# Patient Record
Sex: MALE | Race: WHITE | NOT HISPANIC OR LATINO | URBAN - METROPOLITAN AREA
[De-identification: names, ages, dates, MRNs, and addresses within clinical notes are randomized per-mention and may not be internally consistent; named-entity substitution may affect disease eponyms.]

---

## 2022-04-19 ENCOUNTER — EMERGENCY (EMERGENCY)
Facility: HOSPITAL | Age: 47
LOS: 1 days | Discharge: LEFT BEFORE DISCHARGE | End: 2022-04-19
Attending: EMERGENCY MEDICINE | Admitting: EMERGENCY MEDICINE
Payer: COMMERCIAL

## 2022-04-19 VITALS
RESPIRATION RATE: 18 BRPM | HEART RATE: 70 BPM | DIASTOLIC BLOOD PRESSURE: 81 MMHG | SYSTOLIC BLOOD PRESSURE: 158 MMHG | TEMPERATURE: 98 F | OXYGEN SATURATION: 99 % | HEIGHT: 73 IN | WEIGHT: 169.98 LBS

## 2022-04-19 DIAGNOSIS — R10.9 UNSPECIFIED ABDOMINAL PAIN: ICD-10-CM

## 2022-04-19 DIAGNOSIS — F32.A DEPRESSION, UNSPECIFIED: ICD-10-CM

## 2022-04-19 LAB
ALBUMIN SERPL ELPH-MCNC: 4.2 G/DL — SIGNIFICANT CHANGE UP (ref 3.3–5)
ALP SERPL-CCNC: 69 U/L — SIGNIFICANT CHANGE UP (ref 40–120)
ALT FLD-CCNC: 16 U/L — SIGNIFICANT CHANGE UP (ref 10–45)
ANION GAP SERPL CALC-SCNC: 12 MMOL/L — SIGNIFICANT CHANGE UP (ref 5–17)
AST SERPL-CCNC: 20 U/L — SIGNIFICANT CHANGE UP (ref 10–40)
BASOPHILS # BLD AUTO: 0.03 K/UL — SIGNIFICANT CHANGE UP (ref 0–0.2)
BASOPHILS NFR BLD AUTO: 0.4 % — SIGNIFICANT CHANGE UP (ref 0–2)
BILIRUB SERPL-MCNC: 0.5 MG/DL — SIGNIFICANT CHANGE UP (ref 0.2–1.2)
BUN SERPL-MCNC: 16 MG/DL — SIGNIFICANT CHANGE UP (ref 7–23)
CALCIUM SERPL-MCNC: 9.3 MG/DL — SIGNIFICANT CHANGE UP (ref 8.4–10.5)
CHLORIDE SERPL-SCNC: 102 MMOL/L — SIGNIFICANT CHANGE UP (ref 96–108)
CO2 SERPL-SCNC: 24 MMOL/L — SIGNIFICANT CHANGE UP (ref 22–31)
CREAT SERPL-MCNC: 0.82 MG/DL — SIGNIFICANT CHANGE UP (ref 0.5–1.3)
EGFR: 110 ML/MIN/1.73M2 — SIGNIFICANT CHANGE UP
EOSINOPHIL # BLD AUTO: 0.16 K/UL — SIGNIFICANT CHANGE UP (ref 0–0.5)
EOSINOPHIL NFR BLD AUTO: 2.2 % — SIGNIFICANT CHANGE UP (ref 0–6)
GLUCOSE SERPL-MCNC: 110 MG/DL — HIGH (ref 70–99)
HCT VFR BLD CALC: 45.6 % — SIGNIFICANT CHANGE UP (ref 39–50)
HGB BLD-MCNC: 15.7 G/DL — SIGNIFICANT CHANGE UP (ref 13–17)
IMM GRANULOCYTES NFR BLD AUTO: 0.3 % — SIGNIFICANT CHANGE UP (ref 0–1.5)
LIDOCAIN IGE QN: 41 U/L — SIGNIFICANT CHANGE UP (ref 7–60)
LYMPHOCYTES # BLD AUTO: 2.08 K/UL — SIGNIFICANT CHANGE UP (ref 1–3.3)
LYMPHOCYTES # BLD AUTO: 29.1 % — SIGNIFICANT CHANGE UP (ref 13–44)
MCHC RBC-ENTMCNC: 31.6 PG — SIGNIFICANT CHANGE UP (ref 27–34)
MCHC RBC-ENTMCNC: 34.4 GM/DL — SIGNIFICANT CHANGE UP (ref 32–36)
MCV RBC AUTO: 91.8 FL — SIGNIFICANT CHANGE UP (ref 80–100)
MONOCYTES # BLD AUTO: 0.61 K/UL — SIGNIFICANT CHANGE UP (ref 0–0.9)
MONOCYTES NFR BLD AUTO: 8.5 % — SIGNIFICANT CHANGE UP (ref 2–14)
NEUTROPHILS # BLD AUTO: 4.25 K/UL — SIGNIFICANT CHANGE UP (ref 1.8–7.4)
NEUTROPHILS NFR BLD AUTO: 59.5 % — SIGNIFICANT CHANGE UP (ref 43–77)
NRBC # BLD: 0 /100 WBCS — SIGNIFICANT CHANGE UP (ref 0–0)
PLATELET # BLD AUTO: 262 K/UL — SIGNIFICANT CHANGE UP (ref 150–400)
POTASSIUM SERPL-MCNC: 4 MMOL/L — SIGNIFICANT CHANGE UP (ref 3.5–5.3)
POTASSIUM SERPL-SCNC: 4 MMOL/L — SIGNIFICANT CHANGE UP (ref 3.5–5.3)
PROT SERPL-MCNC: 6.7 G/DL — SIGNIFICANT CHANGE UP (ref 6–8.3)
RBC # BLD: 4.97 M/UL — SIGNIFICANT CHANGE UP (ref 4.2–5.8)
RBC # FLD: 12.9 % — SIGNIFICANT CHANGE UP (ref 10.3–14.5)
SODIUM SERPL-SCNC: 138 MMOL/L — SIGNIFICANT CHANGE UP (ref 135–145)
WBC # BLD: 7.15 K/UL — SIGNIFICANT CHANGE UP (ref 3.8–10.5)
WBC # FLD AUTO: 7.15 K/UL — SIGNIFICANT CHANGE UP (ref 3.8–10.5)

## 2022-04-19 PROCEDURE — 83690 ASSAY OF LIPASE: CPT

## 2022-04-19 PROCEDURE — 36415 COLL VENOUS BLD VENIPUNCTURE: CPT

## 2022-04-19 PROCEDURE — 80053 COMPREHEN METABOLIC PANEL: CPT

## 2022-04-19 PROCEDURE — 99283 EMERGENCY DEPT VISIT LOW MDM: CPT

## 2022-04-19 PROCEDURE — 99284 EMERGENCY DEPT VISIT MOD MDM: CPT

## 2022-04-19 PROCEDURE — 85025 COMPLETE CBC W/AUTO DIFF WBC: CPT

## 2022-04-19 RX ORDER — ACETAMINOPHEN 500 MG
650 TABLET ORAL ONCE
Refills: 0 | Status: COMPLETED | OUTPATIENT
Start: 2022-04-19 | End: 2022-04-19

## 2022-04-19 RX ORDER — SODIUM CHLORIDE 9 MG/ML
1000 INJECTION INTRAMUSCULAR; INTRAVENOUS; SUBCUTANEOUS ONCE
Refills: 0 | Status: COMPLETED | OUTPATIENT
Start: 2022-04-19 | End: 2022-04-19

## 2022-04-19 RX ADMIN — Medication 30 MILLILITER(S): at 14:07

## 2022-04-19 RX ADMIN — SODIUM CHLORIDE 1000 MILLILITER(S): 9 INJECTION INTRAMUSCULAR; INTRAVENOUS; SUBCUTANEOUS at 14:04

## 2022-04-19 RX ADMIN — Medication 650 MILLIGRAM(S): at 14:07

## 2022-04-19 NOTE — ED PROVIDER NOTE - PATIENT PORTAL LINK FT
You can access the FollowMyHealth Patient Portal offered by [facility] by registering at the following website: [email or web address]. By joining [employer]’s FollowMyHealth portal, you will also be able to view your health information using other applications (apps) compatible with our system.

## 2022-04-19 NOTE — ED ADULT TRIAGE NOTE - CHIEF COMPLAINT QUOTE
Pt co abdominal pain and bloating x months, had an unremarkable endoscopy recently. denies known triggers. denies f/c.

## 2022-04-19 NOTE — ED ADULT NURSE NOTE - OBJECTIVE STATEMENT
Pt presents to ED C/O intermittent lower bilateral abdominal pain with nausea x several months. States, " I drink alcohol to rehabilitate from the pain". No withdraw symptoms noted upon assessment. Pt denies fever, CP, SOB.

## 2022-04-19 NOTE — ED ADULT NURSE NOTE - BOWEL SOUNDS LUQ
"Chief Complaint   Patient presents with     Pain     right ankle     Arthritis     right ankle       Initial BP (!) 150/84 (BP Location: Right arm, Patient Position: Sitting, Cuff Size: Adult Regular)   Pulse 77   Temp 97.6  F (36.4  C) (Tympanic)   Ht 1.676 m (5' 6\")   Wt 67.6 kg (149 lb)   SpO2 98%   BMI 24.05 kg/m   Estimated body mass index is 24.05 kg/m  as calculated from the following:    Height as of this encounter: 1.676 m (5' 6\").    Weight as of this encounter: 67.6 kg (149 lb).  Medication Reconciliation: complete  Piedad Fontanez LPN  " present

## 2022-04-19 NOTE — ED PROVIDER NOTE - ATTENDING CONTRIBUTION TO CARE
46M PMH depression, umbilical hernia, p/w abd pain. Has 1yr of intermittent periumbilical pain, worse after eating. Occasional nausea. Had EGD ~1mo ago reportedly showing gastritis/esophagitis. Pt drinks >3-4x per week - states its the only thing that helps his pain. Came to ED today hoping to "get an in house work up."   Denies fevers, chills, vomiting, diarrhea, black stool, bloody stool, dysuria, hematuria, urinary frequency, focal weakness/numbness, lightheadedness, back pain, testicular/penile pain, SOB, CP, rhinorrhea, nasal congestion, sore throat, cough. No change in sense of taste or smell. Normal PO intake.  Mild HTN, other vitals wnl. Exam as above.  ddx: Likely GERD/gastritis/PUD. Benign abdomen.  Will check basic labs, IVF/symptom control, reassess.

## 2022-04-19 NOTE — ED PROVIDER NOTE - NSFOLLOWUPINSTRUCTIONS_ED_ALL_ED_FT
Can take tylenol 650mg every 6hrs as needed for pain.    Follow up with your primary doctor within 1-2 days.     Return for persistent fever/vomit, uncontrolled pain, difficulty breathing, worsening lightheaded.    Follow up with gastroenterologist for persistent symptoms. Can call [phone] to schedule appointment.     Alcohol is harmful to your health!    SEEK IMMEDIATE MEDICAL CARE IF YOU HAVE ANY OF THE FOLLOWING SYMPTOMS: worsening abdominal pain, uncontrollable vomiting, profuse diarrhea, inability to have bowel movements or pass gas, black or bloody stools, fever accompanying chest pain or back pain, or fainting. These symptoms may represent a serious problem that is an emergency. Do not wait to see if the symptoms will go away. Get medical help right away. Call 911 and do not drive yourself to the hospital.    Abdominal Pain, Adult    Abdominal pain can be caused by many things. Often, abdominal pain is not serious and it gets better with no treatment or by being treated at home. However, sometimes abdominal pain is serious. Your health care provider will do a medical history and a physical exam to try to determine the cause of your abdominal pain.    Follow these instructions at home:  Take over-the-counter and prescription medicines only as told by your health care provider. Do not take a laxative unless told by your health care provider.  Drink enough fluid to keep your urine clear or pale yellow.  Watch your condition for any changes.  Keep all follow-up visits as told by your health care provider. This is important.    Contact a health care provider if:  Your abdominal pain changes or gets worse.  You are not hungry or you lose weight without trying.  You are constipated or have diarrhea for more than 2–3 days.  You have pain when you urinate or have a bowel movement.  Your abdominal pain wakes you up at night.  Your pain gets worse with meals, after eating, or with certain foods.  You are throwing up and cannot keep anything down.  You have a fever.    Get help right away if:  Your pain does not go away as soon as your health care provider told you to expect.  You cannot stop throwing up.  Your pain is only in areas of the abdomen, such as the right side or the left lower portion of the abdomen.  You have bloody or black stools, or stools that look like tar.  You have severe pain, cramping, or bloating in your abdomen.  You have signs of dehydration, such as:  Dark urine, very little urine, or no urine.  Cracked lips.  Dry mouth.  Sunken eyes.  Sleepiness.  Weakness.     Follow up with a primary doctor:    Dr. Fay De La Cruz, Dr. Arturo Alaniz, Dr. Carrasco   [address]  [phone]    [facility] located at [address] [place] [address]  Dr Sierra, Dr. Obrine, Dr. Tim, Dr. Brewer, DeDarnell Hansen  [phone]    Dr. Shannon Martinez   [address]  [phone]    Dr. Allyson Lawrence, Dr. Lola Mcdermott, Dr. Pilo Rueda, Dr. Zamudio [facility]  [address]  [phone]    Dr. Fletcher Man  [address]  [phone]    Dr. Apolonia Johns  [address]  [phone]    Dr. Allyson Muñoz, Dr. Juan Carlos Juarez, Dr. Alfonzo Alaniz  [address]  [phone]

## 2022-04-19 NOTE — ED PROVIDER NOTE - PROGRESS NOTE DETAILS
K[place]: labs grossly wnl. I discussed potential dc instructions earlier w/ pt - pt [facility] left prior to reassessment and printed instructions.

## 2022-04-19 NOTE — ED ADULT NURSE NOTE - NS ED NURSE ELOPE COMMENTS
States, " I don't want to wait, I was hoping the  could just give me a pill". Pt educated, A&O x4 with capacity.

## 2022-04-19 NOTE — ED PROVIDER NOTE - OBJECTIVE STATEMENT
Pt is 47 y/o with hx of depression and umbilical hernia presenting w/ abdominal pain. Pt states that for last yr he has been having intermittent eps of abdominal pain. Eps will be triggered by food, and he will then feel this cramping squeezing sensation over central abdomen that is 8/10 pain. States he feels like there is a brick in his stomach. Episode will last 3-4 days before self-resolving. He then typically has 1 day pain-free before cycle begins again. Pt states he eats healthy, has changed diet and cut out lactose/gluten but no resolution. No pain after eating. He has 1 BM a day, often his stools float. No diarrhea/constipation. Endorses occasional mild nausea. Starting few wks ago he also reports right upper abdomen feeling harder than normal. Pt has seen outpt gastro who did upper GI endoscopy that showed gastritis and grade B esophagitis, was put on omeprazole for 1 mo but reprots no changes in sx. Has also tried pepsid/peptobismol with no relief. Has not had a colonoscopy. Denies f/c, CP, SOB. No wt loss.

## 2024-05-10 PROBLEM — Z00.00 ENCOUNTER FOR PREVENTIVE HEALTH EXAMINATION: Status: ACTIVE | Noted: 2024-05-10

## 2024-05-13 ENCOUNTER — APPOINTMENT (OUTPATIENT)
Dept: ORTHOPEDIC SURGERY | Facility: CLINIC | Age: 49
End: 2024-05-13
Payer: COMMERCIAL

## 2024-05-13 VITALS
HEART RATE: 60 BPM | SYSTOLIC BLOOD PRESSURE: 138 MMHG | OXYGEN SATURATION: 98 % | DIASTOLIC BLOOD PRESSURE: 76 MMHG | WEIGHT: 183 LBS | BODY MASS INDEX: 24.25 KG/M2 | HEIGHT: 73 IN

## 2024-05-13 PROCEDURE — 73030 X-RAY EXAM OF SHOULDER: CPT | Mod: RT

## 2024-05-13 PROCEDURE — 99203 OFFICE O/P NEW LOW 30 MIN: CPT | Mod: 25

## 2024-05-13 PROCEDURE — 20610 DRAIN/INJ JOINT/BURSA W/O US: CPT | Mod: RT

## 2024-05-13 NOTE — PHYSICAL EXAM
[de-identified] : Right shoulder full AROM Positive Malave and Neer : IS and subscap 5/5 to isometric testing but SS 4/5  Positive mild anterior apprehension and relocation test.  After sub-acromial injection SS strength returned to normal and Neer and Malave and apprehension test negative.  [de-identified] : 4 views of right shoulder show no bony abnormalities or head elevation.  Minimal AC joint narrowing

## 2024-05-13 NOTE — HISTORY OF PRESENT ILLNESS
[de-identified] : CHANDRAKANT MONTEJO is a 48 year  old  M patient that presents today with right shoulder pain that came out of nowhere a few months ago. He states that when he plays the guitar his shoulder hurts near the deltoid and SS insertion but when he works out it is ok. he wants to get this checked out. Interesting ly he has been using xanax due to a recent job loss and has said it effects his sleeping position and he notes ache when he sleeps after xanax but not after he stopped taking it.

## 2024-05-13 NOTE — PROCEDURE
[de-identified] : The right shoulder was prepped with alcohol and ethyl chloride was used to numb the skin. A 6 cc injection with 3 cc xylocaine, 2 cc sensoricaine and 1 cc kenalog was given without complication into the Sub acromial space. Patient instructed that there may be an inflammatory flare for 24 hrs , to use ice or advil if needed

## 2024-05-13 NOTE — DISCUSSION/SUMMARY
[de-identified] : 48 year old with Right shoulder RC tendonitis possible mid subluxation.  Improved after SA injection  Plan :  PT for RC and scapular stabilization and posterior capsular stretching.  F/U 6 weeks to see if further work-up is needed.

## 2024-06-24 ENCOUNTER — APPOINTMENT (OUTPATIENT)
Dept: ORTHOPEDIC SURGERY | Facility: CLINIC | Age: 49
End: 2024-06-24
Payer: COMMERCIAL

## 2024-06-24 DIAGNOSIS — S43.001A UNSPECIFIED SUBLUXATION OF RIGHT SHOULDER JOINT, INITIAL ENCOUNTER: ICD-10-CM

## 2024-06-24 DIAGNOSIS — M75.81 OTHER SHOULDER LESIONS, RIGHT SHOULDER: ICD-10-CM

## 2024-06-24 PROCEDURE — 99213 OFFICE O/P EST LOW 20 MIN: CPT
